# Patient Record
Sex: MALE | Race: WHITE | URBAN - METROPOLITAN AREA
[De-identification: names, ages, dates, MRNs, and addresses within clinical notes are randomized per-mention and may not be internally consistent; named-entity substitution may affect disease eponyms.]

---

## 2017-03-12 DIAGNOSIS — E78.2 MIXED HYPERLIPIDEMIA: Primary | ICD-10-CM

## 2017-03-13 RX ORDER — ATORVASTATIN CALCIUM 10 MG/1
10 TABLET, FILM COATED ORAL DAILY
Qty: 90 TABLET | Refills: 1 | Status: SHIPPED | OUTPATIENT
Start: 2017-03-13 | End: 2017-09-09 | Stop reason: SDUPTHER

## 2017-03-20 DIAGNOSIS — E78.2 MIXED HYPERLIPIDEMIA: ICD-10-CM

## 2017-03-20 LAB
ALBUMIN SERPL-MCNC: 4.3 G/DL (ref 3.4–5)
ANION GAP SERPL CALCULATED.3IONS-SCNC: 13 MMOL/L (ref 3–16)
BUN BLDV-MCNC: 14 MG/DL (ref 7–20)
CALCIUM SERPL-MCNC: 9.2 MG/DL (ref 8.3–10.6)
CHLORIDE BLD-SCNC: 105 MMOL/L (ref 99–110)
CHOLESTEROL, TOTAL: 142 MG/DL (ref 0–199)
CO2: 25 MMOL/L (ref 21–32)
CREAT SERPL-MCNC: 1.1 MG/DL (ref 0.9–1.3)
GFR AFRICAN AMERICAN: >60
GFR NON-AFRICAN AMERICAN: >60
GLUCOSE BLD-MCNC: 99 MG/DL (ref 70–99)
HDLC SERPL-MCNC: 54 MG/DL (ref 40–60)
LDL CHOLESTEROL CALCULATED: 71 MG/DL
PHOSPHORUS: 2.5 MG/DL (ref 2.5–4.9)
POTASSIUM SERPL-SCNC: 4.3 MMOL/L (ref 3.5–5.1)
SODIUM BLD-SCNC: 143 MMOL/L (ref 136–145)
TRIGL SERPL-MCNC: 83 MG/DL (ref 0–150)
VLDLC SERPL CALC-MCNC: 17 MG/DL

## 2017-03-23 ENCOUNTER — OFFICE VISIT (OUTPATIENT)
Dept: FAMILY MEDICINE CLINIC | Age: 37
End: 2017-03-23

## 2017-03-23 VITALS
TEMPERATURE: 97.9 F | DIASTOLIC BLOOD PRESSURE: 70 MMHG | SYSTOLIC BLOOD PRESSURE: 125 MMHG | WEIGHT: 204 LBS | BODY MASS INDEX: 27.67 KG/M2

## 2017-03-23 DIAGNOSIS — I10 ESSENTIAL HYPERTENSION: ICD-10-CM

## 2017-03-23 DIAGNOSIS — R00.1 BRADYCARDIA: ICD-10-CM

## 2017-03-23 DIAGNOSIS — Z11.4 SCREENING FOR HIV (HUMAN IMMUNODEFICIENCY VIRUS): ICD-10-CM

## 2017-03-23 DIAGNOSIS — K21.9 GASTROESOPHAGEAL REFLUX DISEASE, ESOPHAGITIS PRESENCE NOT SPECIFIED: ICD-10-CM

## 2017-03-23 DIAGNOSIS — F43.9 STRESS AT HOME: ICD-10-CM

## 2017-03-23 DIAGNOSIS — E78.2 MIXED HYPERLIPIDEMIA: Primary | ICD-10-CM

## 2017-03-23 PROCEDURE — 99214 OFFICE O/P EST MOD 30 MIN: CPT | Performed by: FAMILY MEDICINE

## 2017-03-23 RX ORDER — METOPROLOL SUCCINATE 25 MG/1
12.5 TABLET, EXTENDED RELEASE ORAL DAILY
Qty: 90 TABLET | Refills: 1 | Status: SHIPPED
Start: 2017-03-23 | End: 2018-02-13 | Stop reason: SDUPTHER

## 2017-03-23 ASSESSMENT — ENCOUNTER SYMPTOMS
RESPIRATORY NEGATIVE: 1
GASTROINTESTINAL NEGATIVE: 1
EYES NEGATIVE: 1

## 2017-09-09 DIAGNOSIS — E78.2 MIXED HYPERLIPIDEMIA: ICD-10-CM

## 2017-09-09 RX ORDER — ATORVASTATIN CALCIUM 10 MG/1
10 TABLET, FILM COATED ORAL DAILY
Qty: 90 TABLET | Refills: 1 | Status: SHIPPED | OUTPATIENT
Start: 2017-09-09 | End: 2018-02-13 | Stop reason: SDUPTHER

## 2017-10-02 DIAGNOSIS — I10 ESSENTIAL HYPERTENSION: ICD-10-CM

## 2017-10-02 DIAGNOSIS — Z11.4 SCREENING FOR HIV (HUMAN IMMUNODEFICIENCY VIRUS): ICD-10-CM

## 2017-10-02 DIAGNOSIS — E78.2 MIXED HYPERLIPIDEMIA: ICD-10-CM

## 2017-10-02 LAB
A/G RATIO: 1.7 (ref 1.1–2.2)
ALBUMIN SERPL-MCNC: 4.4 G/DL (ref 3.4–5)
ALP BLD-CCNC: 77 U/L (ref 40–129)
ALT SERPL-CCNC: 44 U/L (ref 10–40)
ANION GAP SERPL CALCULATED.3IONS-SCNC: 17 MMOL/L (ref 3–16)
AST SERPL-CCNC: 30 U/L (ref 15–37)
BILIRUB SERPL-MCNC: 0.9 MG/DL (ref 0–1)
BUN BLDV-MCNC: 15 MG/DL (ref 7–20)
CALCIUM SERPL-MCNC: 9.6 MG/DL (ref 8.3–10.6)
CHLORIDE BLD-SCNC: 102 MMOL/L (ref 99–110)
CHOLESTEROL, TOTAL: 220 MG/DL (ref 0–199)
CO2: 25 MMOL/L (ref 21–32)
CREAT SERPL-MCNC: 1.1 MG/DL (ref 0.9–1.3)
GFR AFRICAN AMERICAN: >60
GFR NON-AFRICAN AMERICAN: >60
GLOBULIN: 2.6 G/DL
GLUCOSE BLD-MCNC: 93 MG/DL (ref 70–99)
HDLC SERPL-MCNC: 53 MG/DL (ref 40–60)
LDL CHOLESTEROL CALCULATED: 135 MG/DL
POTASSIUM SERPL-SCNC: 4.9 MMOL/L (ref 3.5–5.1)
SODIUM BLD-SCNC: 144 MMOL/L (ref 136–145)
TOTAL PROTEIN: 7 G/DL (ref 6.4–8.2)
TRIGL SERPL-MCNC: 159 MG/DL (ref 0–150)
VLDLC SERPL CALC-MCNC: 32 MG/DL

## 2017-10-03 LAB — HIV-1 AND HIV-2 ANTIBODIES: NORMAL

## 2017-10-04 ENCOUNTER — OFFICE VISIT (OUTPATIENT)
Dept: FAMILY MEDICINE CLINIC | Age: 37
End: 2017-10-04

## 2017-10-04 VITALS
DIASTOLIC BLOOD PRESSURE: 86 MMHG | BODY MASS INDEX: 28.39 KG/M2 | WEIGHT: 209.6 LBS | HEART RATE: 97 BPM | HEIGHT: 72 IN | OXYGEN SATURATION: 98 % | SYSTOLIC BLOOD PRESSURE: 128 MMHG | TEMPERATURE: 98.1 F

## 2017-10-04 DIAGNOSIS — I10 ESSENTIAL HYPERTENSION: ICD-10-CM

## 2017-10-04 DIAGNOSIS — E78.2 MIXED HYPERLIPIDEMIA: Primary | ICD-10-CM

## 2017-10-04 DIAGNOSIS — K21.9 GASTROESOPHAGEAL REFLUX DISEASE, ESOPHAGITIS PRESENCE NOT SPECIFIED: ICD-10-CM

## 2017-10-04 PROCEDURE — 99214 OFFICE O/P EST MOD 30 MIN: CPT | Performed by: NURSE PRACTITIONER

## 2017-10-04 ASSESSMENT — ENCOUNTER SYMPTOMS
HOARSE VOICE: 0
ORTHOPNEA: 0
COUGH: 0
BLURRED VISION: 0
EYES NEGATIVE: 1
ABDOMINAL PAIN: 0
NAUSEA: 0
GLOBUS SENSATION: 0
GASTROINTESTINAL NEGATIVE: 1
ALLERGIC/IMMUNOLOGIC NEGATIVE: 1
WATER BRASH: 0
SORE THROAT: 0
WHEEZING: 0
HEARTBURN: 0
BELCHING: 0
CHOKING: 0
SHORTNESS OF BREATH: 0
STRIDOR: 0
RESPIRATORY NEGATIVE: 1

## 2017-10-04 NOTE — PROGRESS NOTES
10/4/17    Yany Hendrix  1980      Chief Complaint   Patient presents with    Hyperlipidemia    Hypertension    Gastroesophageal Reflux       Vitals:    10/04/17 0902   BP: 128/86   Pulse: 97   Temp: 98.1 °F (36.7 °C)   SpO2: 98%         Immunization History   Administered Date(s) Administered    Influenza Vaccine, unspecified formulation 11/03/2016    Influenza Virus Vaccine 09/16/2013    Tdap (Boostrix, Adacel) 07/06/2011       No Known Allergies  Outpatient Prescriptions Marked as Taking for the 10/4/17 encounter (Office Visit) with Adin Guallpa NP   Medication Sig Dispense Refill    atorvastatin (LIPITOR) 10 MG tablet Take 1 tablet by mouth daily 90 tablet 1    metoprolol succinate (TOPROL XL) 25 MG extended release tablet Take 0.5 tablets by mouth daily 90 tablet 1    Cholecalciferol (VITAMIN D) 1000 UNIT CAPS Take 1 capsule by mouth daily.  Flaxseed, Linseed, (FLAX SEED OIL) 1000 MG CAPS Take 1 capsule by mouth daily. Past Medical History:   Diagnosis Date    Chicken pox     Hyperlipidemia      Past Surgical History:   Procedure Laterality Date    ADENOIDECTOMY      WISDOM TOOTH EXTRACTION       Family History   Problem Relation Age of Onset    High Blood Pressure Mother     Diabetes Father     High Cholesterol Father     Stroke Father     Heart Disease Other     Arthritis Sister     Cancer Maternal Grandmother      Social History     Social History    Marital status:      Spouse name: N/A    Number of children: N/A    Years of education: N/A     Occupational History    Not on file. Social History Main Topics    Smoking status: Never Smoker    Smokeless tobacco: Never Used    Alcohol use Yes    Drug use: Not on file    Sexual activity: Yes     Other Topics Concern    Not on file     Social History Narrative         Any recent diagnostic tests, hospital reports, office notes or consultation letters were reviewed prior to and during the visit. Hyperlipidemia   This is a chronic problem. The current episode started more than 1 year ago. He has no history of chronic renal disease, diabetes, hypothyroidism or liver disease. Pertinent negatives include no chest pain, focal sensory loss, focal weakness, leg pain, myalgias or shortness of breath. Current antihyperlipidemic treatment includes herbal therapy and statins. Compliance problems include adherence to exercise and adherence to diet. Hypertension   This is a chronic problem. The current episode started more than 1 year ago. Pertinent negatives include no anxiety, blurred vision, chest pain, headaches, malaise/fatigue, neck pain, orthopnea, palpitations, peripheral edema, PND, shortness of breath or sweats. Past treatments include beta blockers. There is no history of chronic renal disease. Gastroesophageal Reflux   He reports no abdominal pain, no belching, no chest pain, no choking, no coughing, no dysphagia, no early satiety, no globus sensation, no heartburn, no hoarse voice, no nausea, no sore throat, no stridor, no tooth decay, no water brash or no wheezing. The problem has been resolved. Pertinent negatives include no anemia, fatigue, melena, muscle weakness, orthopnea or weight loss. Review of Systems   Constitutional: Negative. Negative for fatigue, malaise/fatigue and weight loss. HENT: Negative. Negative for hoarse voice and sore throat. Eyes: Negative. Negative for blurred vision. Respiratory: Negative. Negative for cough, choking, shortness of breath and wheezing. Cardiovascular: Negative. Negative for chest pain, palpitations, orthopnea and PND. Gastrointestinal: Negative. Negative for abdominal pain, dysphagia, heartburn, melena and nausea. Endocrine: Negative. Genitourinary: Negative. Musculoskeletal: Negative. Negative for myalgias, muscle weakness and neck pain. Skin: Negative. Allergic/Immunologic: Negative. Neurological: Negative.

## 2017-10-04 NOTE — MR AVS SNAPSHOT
After Visit Summary             Breanne Fajardo   10/4/2017 9:00 AM   Office Visit    Description:  Male : 1980   Provider:  Stanley Melo NP   Department:  [address] [place] [address] and Future Appointments         Below is a list of your follow-up and future appointments. This may not be a complete list as you may have made appointments directly with providers that we are not aware of or your providers may have made some for you. Please call your providers to confirm appointments. It is important to keep your appointments. Please bring your current insurance card, photo ID, co-pay, and all medication bottles to your appointment. If self-pay, payment is expected at the time of service. Your To-Do List     Future Orders Complete By Expires    Comprehensive Metabolic Panel [WDE43 Custom]  10/4/2017 10/5/2018    Lipid Panel [facility] Custom]  2018 10/4/2018         Information from Your Visit        Department     Name Address Phone Fax    [address] [facility] [address] [place] [address] [phone]      You Were Seen for:         Comments    Mixed hyperlipidemia   [272. 2. ICD-9-CM]         Vital Signs     Blood Pressure Pulse Temperature Height Weight Oxygen Saturation    128/86 97 98.1 °F (36.7 °C) (Temporal) 6' (1.829 m) 209 lb 9.6 oz (95.1 kg) 98%    Body Mass Index Smoking Status                28.43 kg/m2 Never Smoker          Additional Information about your Body Mass Index (BMI)           Your BMI as listed above is considered overweight (25.0-29.9). BMI is an estimate of body fat, calculated from your height and weight. The higher your BMI, the greater your risk of heart disease, high blood pressure, type 2 diabetes, stroke, gallstones, arthritis, sleep apnea, and certain cancers. BMI is not perfect. It may overestimate body fat in athletes and people who are more muscular.   If your body fat is high you can improve your BMI by decreasing your calorie intake and becoming more physically active. Learn more at: [employer]             Today's Medication Changes          These changes are accurate as of: 10/4/17  9:23 AM.  If you have any questions, ask your nurse or doctor. STOP taking these medications           fish oil-omega-3 fatty acids 1000 MG capsule   Stopped by:  Abel Cabezas NP               Your Current Medications Are              atorvastatin (LIPITOR) 10 MG tablet Take 1 tablet by mouth daily    metoprolol succinate (TOPROL XL) 25 MG extended release tablet Take 0.5 tablets by mouth daily    Cholecalciferol (VITAMIN D) 1000 UNIT CAPS Take 1 capsule by mouth daily. Flaxseed, Linseed, (FLAX SEED OIL) 1000 MG CAPS Take 1 capsule by mouth daily. Allergies           No Known Allergies         Additional Information        Basic Information     Date Of Birth Sex Race Ethnicity Preferred Language    1980 Male White Non-/Non  English      Problem List as of 10/4/2017  Date Reviewed: 9/15/2014                Mixed hyperlipidemia    Gastroesophageal reflux disease    Essential hypertension    Vitamin D deficiency      Your Goals as of 10/4/2017 at 9:23 AM                 Weight    Weight < 200 lb (90.719 kg)     Notes    The patient has a goal of losing five pounds in the next three months. He/she feels they have an 8/10 chance of doing this. They will do this through lifestyle changes including less caloric intake and increased exercise. Immunizations as of 10/4/2017     Name Date    Influenza Vaccine, unspecified formulation 11/3/2016    Influenza Virus Vaccine 9/16/2013    Tdap (Boostrix, Adacel) 7/6/2011      Preventive Care        Date Due    Yearly Flu Vaccine (1) 9/1/2017    Tetanus Combination Vaccine (2 - Td) 7/6/2021            MyChart Signup           Our records indicate that you have an active [employer] account. You can view your After Visit Summary by going to [email or web address] and logging in with your [employer] username and password. If you don't have a [employer] username and password but a parent or guardian has access to your record, the parent or guardian should login with their own [employer] username and password and access your record to view the After Visit Summary. Additional Information  If you have questions, please contact the physician practice where you receive care. Remember, [employer] is NOT to be used for urgent needs. For medical emergencies, dial 911. For questions regarding your [employer] account call [phone]. If you have a clinical question, please call your doctor's office.

## 2018-01-04 DIAGNOSIS — I10 ESSENTIAL HYPERTENSION: ICD-10-CM

## 2018-01-04 RX ORDER — METOPROLOL SUCCINATE 25 MG/1
TABLET, EXTENDED RELEASE ORAL
Qty: 90 TABLET | Refills: 1 | Status: SHIPPED | OUTPATIENT
Start: 2018-01-04 | End: 2018-07-13 | Stop reason: SDUPTHER

## 2018-02-02 DIAGNOSIS — E78.2 MIXED HYPERLIPIDEMIA: ICD-10-CM

## 2018-02-02 LAB
A/G RATIO: 2.1 (ref 1.1–2.2)
ALBUMIN SERPL-MCNC: 4.8 G/DL (ref 3.4–5)
ALP BLD-CCNC: 77 U/L (ref 40–129)
ALT SERPL-CCNC: 41 U/L (ref 10–40)
ANION GAP SERPL CALCULATED.3IONS-SCNC: 12 MMOL/L (ref 3–16)
AST SERPL-CCNC: 25 U/L (ref 15–37)
BILIRUB SERPL-MCNC: 0.8 MG/DL (ref 0–1)
BUN BLDV-MCNC: 17 MG/DL (ref 7–20)
CALCIUM SERPL-MCNC: 9.7 MG/DL (ref 8.3–10.6)
CHLORIDE BLD-SCNC: 103 MMOL/L (ref 99–110)
CHOLESTEROL, TOTAL: 213 MG/DL (ref 0–199)
CO2: 27 MMOL/L (ref 21–32)
CREAT SERPL-MCNC: 1.1 MG/DL (ref 0.9–1.3)
GFR AFRICAN AMERICAN: >60
GFR NON-AFRICAN AMERICAN: >60
GLOBULIN: 2.3 G/DL
GLUCOSE BLD-MCNC: 97 MG/DL (ref 70–99)
HDLC SERPL-MCNC: 59 MG/DL (ref 40–60)
LDL CHOLESTEROL CALCULATED: 126 MG/DL
POTASSIUM SERPL-SCNC: 4.8 MMOL/L (ref 3.5–5.1)
SODIUM BLD-SCNC: 142 MMOL/L (ref 136–145)
TOTAL PROTEIN: 7.1 G/DL (ref 6.4–8.2)
TRIGL SERPL-MCNC: 140 MG/DL (ref 0–150)
VLDLC SERPL CALC-MCNC: 28 MG/DL

## 2018-02-13 ENCOUNTER — OFFICE VISIT (OUTPATIENT)
Dept: FAMILY MEDICINE CLINIC | Age: 38
End: 2018-02-13

## 2018-02-13 VITALS
DIASTOLIC BLOOD PRESSURE: 94 MMHG | BODY MASS INDEX: 28.73 KG/M2 | WEIGHT: 211.8 LBS | SYSTOLIC BLOOD PRESSURE: 140 MMHG | TEMPERATURE: 98 F

## 2018-02-13 DIAGNOSIS — K21.9 GASTROESOPHAGEAL REFLUX DISEASE, ESOPHAGITIS PRESENCE NOT SPECIFIED: ICD-10-CM

## 2018-02-13 DIAGNOSIS — E55.9 VITAMIN D DEFICIENCY: ICD-10-CM

## 2018-02-13 DIAGNOSIS — E78.2 MIXED HYPERLIPIDEMIA: ICD-10-CM

## 2018-02-13 DIAGNOSIS — I10 ESSENTIAL HYPERTENSION: ICD-10-CM

## 2018-02-13 PROCEDURE — 99214 OFFICE O/P EST MOD 30 MIN: CPT | Performed by: FAMILY MEDICINE

## 2018-02-13 RX ORDER — LISINOPRIL 5 MG/1
5 TABLET ORAL DAILY
Qty: 90 TABLET | Refills: 1 | Status: SHIPPED | OUTPATIENT
Start: 2018-02-13 | End: 2018-07-13 | Stop reason: SDUPTHER

## 2018-02-13 RX ORDER — ATORVASTATIN CALCIUM 10 MG/1
10 TABLET, FILM COATED ORAL DAILY
Qty: 90 TABLET | Refills: 1 | Status: SHIPPED | OUTPATIENT
Start: 2018-02-13 | End: 2018-06-06

## 2018-02-13 ASSESSMENT — PATIENT HEALTH QUESTIONNAIRE - PHQ9
SUM OF ALL RESPONSES TO PHQ9 QUESTIONS 1 & 2: 0
2. FEELING DOWN, DEPRESSED OR HOPELESS: 0
SUM OF ALL RESPONSES TO PHQ QUESTIONS 1-9: 0
1. LITTLE INTEREST OR PLEASURE IN DOING THINGS: 0

## 2018-02-13 ASSESSMENT — ENCOUNTER SYMPTOMS
RESPIRATORY NEGATIVE: 1
EYES NEGATIVE: 1
GASTROINTESTINAL NEGATIVE: 1

## 2018-02-14 NOTE — PROGRESS NOTES
Administered    Influenza Vaccine, unspecified formulation 11/03/2016    Influenza Virus Vaccine 09/16/2013    Tdap (Boostrix, Adacel) 07/06/2011       No Known Allergies  Outpatient Prescriptions Marked as Taking for the 2/13/18 encounter (Office Visit) with Kari Gayle MD   Medication Sig Dispense Refill    lisinopril (PRINIVIL;ZESTRIL) 5 MG tablet Take 1 tablet by mouth daily 90 tablet 1    atorvastatin (LIPITOR) 10 MG tablet Take 1 tablet by mouth daily 90 tablet 1    Cholecalciferol (VITAMIN D) 1000 UNIT CAPS Take 1 capsule by mouth daily.  Flaxseed, Linseed, (FLAX SEED OIL) 1000 MG CAPS Take 1 capsule by mouth daily. Past Medical History:   Diagnosis Date    Chicken pox     Hyperlipidemia      Past Surgical History:   Procedure Laterality Date    ADENOIDECTOMY      WISDOM TOOTH EXTRACTION       Family History   Problem Relation Age of Onset    High Blood Pressure Mother     Diabetes Father     High Cholesterol Father     Stroke Father     Heart Disease Other     Arthritis Sister     Cancer Maternal Grandmother      Social History     Social History    Marital status:      Spouse name: N/A    Number of children: N/A    Years of education: N/A     Occupational History    Not on file. Social History Main Topics    Smoking status: Never Smoker    Smokeless tobacco: Never Used    Alcohol use Yes    Drug use: Unknown    Sexual activity: Yes     Other Topics Concern    Not on file     Social History Narrative    No narrative on file         Any recent diagnostic tests, hospital reports, office notes or consultation letters were reviewed prior to and during the visit. Review of Systems   Constitutional: Negative. HENT: Negative. Eyes: Negative. Respiratory: Negative. Cardiovascular: Negative. Gastrointestinal: Negative. Genitourinary: Negative. Musculoskeletal: Negative. Psychiatric/Behavioral: Negative.           Physical Exam Constitutional: He appears well-developed and well-nourished. No distress. Neck: Normal range of motion. Neck supple. Normal carotid pulses, no hepatojugular reflux and no JVD present. Carotid bruit is not present. No tracheal deviation present. No thyromegaly present. Cardiovascular: Normal rate, regular rhythm, S2 normal, normal heart sounds and intact distal pulses. PMI is not displaced. Exam reveals no gallop and no friction rub. No murmur heard. Pulses:       Carotid pulses are 2+ on the right side, and 2+ on the left side. Dorsalis pedis pulses are 2+ on the right side, and 2+ on the left side. Posterior tibial pulses are 2+ on the right side, and 2+ on the left side. Pulmonary/Chest: Effort normal and breath sounds normal. No stridor. No respiratory distress. He has no wheezes. He has no rales. He exhibits no tenderness. Abdominal: Soft. Bowel sounds are normal. He exhibits no distension and no mass. There is no tenderness. There is no rebound and no guarding. Musculoskeletal:        Right ankle: He exhibits no swelling. Left ankle: He exhibits no swelling. Lymphadenopathy:     He has no cervical adenopathy. Skin: He is not diaphoretic. Psychiatric: He has a normal mood and affect. His behavior is normal. Judgment and thought content normal.         1. Mixed hyperlipidemia  Condition stable continue the medications, treatments, will check labs as appropriate       The patient received counseling on the following healthy behaviors: nutrition, exercise, medication adherence and decrease in alcohol consumption    Patient given educational materials on Hyperlipidemia and Nutrition if appropriate    I have instructed the patient to complete a self tracking handout on diet and instructed them to bring it with them to the  next appointment. Discussed use, benefit, and side effects of prescribed medications. Barriers to medication compliance addressed.   All patient questions answered. Pt voiced understanding. Comprehensive Metabolic Panel    Lipid Panel    atorvastatin (LIPITOR) 10 MG tablet   2. Gastroesophageal reflux disease, esophagitis presence not specified  Condition stable continue the medications, treatments, will check labs as appropriate       3. Essential hypertension  Condition stable continue the medications, treatments, will check labs as appropriate       The patient received counseling on the following healthy behaviors: nutrition, exercise, medication adherence and decrease in alcohol consumption    Patient given educational materials on Nutrition, Exercise and Hypertension as appropriate. I have instructed the patient to complete a self tracking handout on Blood Pressures  and instructed them to bring it with them to the next appointment. Discussed use, benefit, and side effects of prescribed medications. Barriers to medication compliance addressed. All patient questions answered. Pt voiced understanding. Comprehensive Metabolic Panel    Lipid Panel    lisinopril (PRINIVIL;ZESTRIL) 5 MG tablet   4.  Vitamin D deficiency  Condition stable continue the medications, treatments, will check labs as appropriate                 Kari Gayle MD

## 2018-06-06 DIAGNOSIS — E78.2 MIXED HYPERLIPIDEMIA: ICD-10-CM

## 2018-06-06 RX ORDER — ATORVASTATIN CALCIUM 10 MG/1
10 TABLET, FILM COATED ORAL DAILY
Qty: 90 TABLET | Refills: 1 | Status: SHIPPED | OUTPATIENT
Start: 2018-06-06

## 2018-07-11 DIAGNOSIS — E78.2 MIXED HYPERLIPIDEMIA: ICD-10-CM

## 2018-07-11 DIAGNOSIS — I10 ESSENTIAL HYPERTENSION: ICD-10-CM

## 2018-07-11 LAB
A/G RATIO: 2 (ref 1.1–2.2)
ALBUMIN SERPL-MCNC: 4.7 G/DL (ref 3.4–5)
ALP BLD-CCNC: 81 U/L (ref 40–129)
ALT SERPL-CCNC: 19 U/L (ref 10–40)
ANION GAP SERPL CALCULATED.3IONS-SCNC: 12 MMOL/L (ref 3–16)
AST SERPL-CCNC: 17 U/L (ref 15–37)
BILIRUB SERPL-MCNC: 0.6 MG/DL (ref 0–1)
BUN BLDV-MCNC: 12 MG/DL (ref 7–20)
CALCIUM SERPL-MCNC: 9.7 MG/DL (ref 8.3–10.6)
CHLORIDE BLD-SCNC: 105 MMOL/L (ref 99–110)
CHOLESTEROL, TOTAL: 162 MG/DL (ref 0–199)
CO2: 26 MMOL/L (ref 21–32)
CREAT SERPL-MCNC: 1.1 MG/DL (ref 0.9–1.3)
GFR AFRICAN AMERICAN: >60
GFR NON-AFRICAN AMERICAN: >60
GLOBULIN: 2.4 G/DL
GLUCOSE BLD-MCNC: 98 MG/DL (ref 70–99)
HDLC SERPL-MCNC: 60 MG/DL (ref 40–60)
LDL CHOLESTEROL CALCULATED: 86 MG/DL
POTASSIUM SERPL-SCNC: 4.7 MMOL/L (ref 3.5–5.1)
SODIUM BLD-SCNC: 143 MMOL/L (ref 136–145)
TOTAL PROTEIN: 7.1 G/DL (ref 6.4–8.2)
TRIGL SERPL-MCNC: 78 MG/DL (ref 0–150)
VLDLC SERPL CALC-MCNC: 16 MG/DL

## 2018-07-13 ENCOUNTER — OFFICE VISIT (OUTPATIENT)
Dept: FAMILY MEDICINE CLINIC | Age: 38
End: 2018-07-13

## 2018-07-13 VITALS
SYSTOLIC BLOOD PRESSURE: 126 MMHG | TEMPERATURE: 98.4 F | DIASTOLIC BLOOD PRESSURE: 88 MMHG | WEIGHT: 201.4 LBS | BODY MASS INDEX: 27.31 KG/M2

## 2018-07-13 DIAGNOSIS — K21.9 GASTROESOPHAGEAL REFLUX DISEASE, ESOPHAGITIS PRESENCE NOT SPECIFIED: ICD-10-CM

## 2018-07-13 DIAGNOSIS — I10 ESSENTIAL HYPERTENSION: ICD-10-CM

## 2018-07-13 DIAGNOSIS — E78.2 MIXED HYPERLIPIDEMIA: ICD-10-CM

## 2018-07-13 PROCEDURE — 99214 OFFICE O/P EST MOD 30 MIN: CPT | Performed by: FAMILY MEDICINE

## 2018-07-13 RX ORDER — LISINOPRIL 5 MG/1
5 TABLET ORAL DAILY
Qty: 90 TABLET | Refills: 1 | Status: SHIPPED | OUTPATIENT
Start: 2018-07-13 | End: 2018-08-01 | Stop reason: SDUPTHER

## 2018-07-13 RX ORDER — METOPROLOL SUCCINATE 25 MG/1
TABLET, EXTENDED RELEASE ORAL
Qty: 90 TABLET | Refills: 1 | Status: SHIPPED | OUTPATIENT
Start: 2018-07-13

## 2018-07-14 ASSESSMENT — ENCOUNTER SYMPTOMS
GASTROINTESTINAL NEGATIVE: 1
RESPIRATORY NEGATIVE: 1
EYES NEGATIVE: 1

## 2018-07-14 NOTE — PROGRESS NOTES
(TOPROL XL) 25 MG extended release tablet TAKE 1 TABLET DAILY 90 tablet 1    atorvastatin (LIPITOR) 10 MG tablet Take 1 tablet by mouth daily 90 tablet 1    Cholecalciferol (VITAMIN D) 1000 UNIT CAPS Take 1 capsule by mouth daily.  Flaxseed, Linseed, (FLAX SEED OIL) 1000 MG CAPS Take 1 capsule by mouth daily. Past Medical History:   Diagnosis Date    Chicken pox     Hyperlipidemia      Past Surgical History:   Procedure Laterality Date    ADENOIDECTOMY      WISDOM TOOTH EXTRACTION       Family History   Problem Relation Age of Onset    High Blood Pressure Mother     Diabetes Father     High Cholesterol Father     Stroke Father     Heart Disease Other     Arthritis Sister     Cancer Maternal Grandmother      Social History     Social History    Marital status:      Spouse name: N/A    Number of children: N/A    Years of education: N/A     Occupational History    Not on file. Social History Main Topics    Smoking status: Never Smoker    Smokeless tobacco: Never Used    Alcohol use Yes    Drug use: Unknown    Sexual activity: Yes     Other Topics Concern    Not on file     Social History Narrative    No narrative on file         Any recent diagnostic tests, hospital reports, office notes or consultation letters were reviewed prior to and during the visit. Review of Systems   Constitutional: Negative. HENT: Negative. Eyes: Negative. Respiratory: Negative. Cardiovascular: Negative. Gastrointestinal: Negative. Genitourinary: Negative. Musculoskeletal: Negative. Psychiatric/Behavioral: Negative. Physical Exam   Constitutional: He appears well-developed and well-nourished. No distress. Neck: Normal range of motion. Neck supple. Normal carotid pulses, no hepatojugular reflux and no JVD present. Carotid bruit is not present. No tracheal deviation present. No thyromegaly present.    Cardiovascular: Normal rate, regular rhythm, S2 normal, received counseling on the following healthy behaviors: nutrition, exercise, medication adherence and decrease in alcohol consumption    Patient given educational materials on Hyperlipidemia and Nutrition if appropriate    I have instructed the patient to complete a self tracking handout on diet and instructed them to bring it with them to the  next appointment. Discussed use, benefit, and side effects of prescribed medications. Barriers to medication compliance addressed. All patient questions answered. Pt voiced understanding. 3. Gastroesophageal reflux disease, esophagitis presence not specified  Condition stable continue the medications, treatments, will check labs as appropriate         . sudhakar Miramontes MD